# Patient Record
Sex: MALE | Race: WHITE | NOT HISPANIC OR LATINO | Employment: OTHER | URBAN - METROPOLITAN AREA
[De-identification: names, ages, dates, MRNs, and addresses within clinical notes are randomized per-mention and may not be internally consistent; named-entity substitution may affect disease eponyms.]

---

## 2022-04-22 RX ORDER — CHOLECALCIFEROL (VITAMIN D3) 25 MCG
1000 TABLET ORAL DAILY
COMMUNITY
Start: 2021-09-30 | End: 2023-06-07 | Stop reason: ALTCHOICE

## 2022-04-22 RX ORDER — LEVOTHYROXINE SODIUM 175 UG/1
175 TABLET ORAL DAILY
COMMUNITY

## 2022-04-22 RX ORDER — PRAZOSIN HYDROCHLORIDE 2 MG/1
2 CAPSULE ORAL NIGHTLY
COMMUNITY
Start: 2021-12-10

## 2022-04-22 RX ORDER — AMLODIPINE BESYLATE 10 MG/1
10 TABLET ORAL DAILY
COMMUNITY
End: 2023-06-07 | Stop reason: ALTCHOICE

## 2022-04-22 RX ORDER — DICLOFENAC SODIUM 10 MG/G
1 GEL TOPICAL 4 TIMES DAILY
COMMUNITY
End: 2023-09-13

## 2022-04-22 RX ORDER — FINASTERIDE 5 MG/1
5 TABLET, FILM COATED ORAL DAILY
COMMUNITY
Start: 2022-04-19

## 2022-04-22 RX ORDER — LISINOPRIL 40 MG/1
20 TABLET ORAL DAILY
COMMUNITY
Start: 2021-09-30 | End: 2023-09-13

## 2022-04-22 RX ORDER — ASPIRIN 81 MG/1
81 TABLET ORAL DAILY
COMMUNITY
Start: 2021-08-17

## 2022-04-22 RX ORDER — INSULIN GLARGINE 100 [IU]/ML
35 INJECTION, SOLUTION SUBCUTANEOUS NIGHTLY
COMMUNITY
Start: 2021-11-29

## 2022-04-22 RX ORDER — FUROSEMIDE 20 MG/1
20 TABLET ORAL
COMMUNITY
End: 2023-06-07 | Stop reason: ALTCHOICE

## 2022-04-22 RX ORDER — METFORMIN HYDROCHLORIDE 1000 MG/1
500 TABLET ORAL 2 TIMES DAILY WITH MEALS
COMMUNITY
Start: 2021-09-30 | End: 2023-06-07 | Stop reason: ALTCHOICE

## 2022-04-22 RX ORDER — METOPROLOL SUCCINATE 200 MG/1
100 TABLET, EXTENDED RELEASE ORAL NIGHTLY
COMMUNITY
Start: 2021-09-30

## 2022-04-22 RX ORDER — ATORVASTATIN CALCIUM 80 MG/1
80 TABLET, FILM COATED ORAL DAILY
COMMUNITY
Start: 2021-09-30

## 2022-04-22 RX ORDER — ISOSORBIDE MONONITRATE 30 MG/1
120 TABLET, EXTENDED RELEASE ORAL 2 TIMES DAILY
COMMUNITY

## 2022-04-22 RX ORDER — SERTRALINE HYDROCHLORIDE 100 MG/1
200 TABLET, FILM COATED ORAL DAILY
COMMUNITY
Start: 2021-09-30

## 2022-04-22 RX ORDER — HYDRALAZINE HYDROCHLORIDE 25 MG/1
25 TABLET, FILM COATED ORAL DAILY
COMMUNITY

## 2022-04-22 RX ORDER — FERROUS GLUCONATE 324(38)MG
324 TABLET ORAL DAILY
COMMUNITY
Start: 2021-09-30 | End: 2023-09-13

## 2022-04-27 ENCOUNTER — OFFICE VISIT (OUTPATIENT)
Dept: NEPHROLOGY | Facility: CLINIC | Age: 72
End: 2022-04-27
Payer: OTHER GOVERNMENT

## 2022-04-27 VITALS
HEIGHT: 65 IN | HEART RATE: 86 BPM | DIASTOLIC BLOOD PRESSURE: 57 MMHG | SYSTOLIC BLOOD PRESSURE: 105 MMHG | BODY MASS INDEX: 41.99 KG/M2 | WEIGHT: 252 LBS | OXYGEN SATURATION: 98 % | TEMPERATURE: 98 F

## 2022-04-27 DIAGNOSIS — N18.32 STAGE 3B CHRONIC KIDNEY DISEASE: Primary | ICD-10-CM

## 2022-04-27 PROCEDURE — 99213 OFFICE O/P EST LOW 20 MIN: CPT | Mod: S$GLB,,, | Performed by: INTERNAL MEDICINE

## 2022-04-27 PROCEDURE — 99213 PR OFFICE/OUTPT VISIT, EST, LEVL III, 20-29 MIN: ICD-10-PCS | Mod: S$GLB,,, | Performed by: INTERNAL MEDICINE

## 2022-04-27 NOTE — PROGRESS NOTES
"Nephrology Progress Note      Patient Name:  Victor Manuel Ignacio    :  1950    Medical Record:  [id number]    Interval History:  Seeing for ckd. No complaints.    Subjective:  Patient seen and examined. No complaints.    Current Medications: Current medications reviewed.    Review of Systems  Review of Systems   HENT: Negative for hearing loss.    Eyes: Negative for blurred vision.   Respiratory: Negative for cough, hemoptysis, sputum production and shortness of breath.    Cardiovascular: Negative for chest pain and palpitations.   Gastrointestinal: Negative for abdominal pain, blood in stool, constipation, diarrhea, heartburn, nausea and vomiting.   Genitourinary: Negative for dysuria.       Objective:      Physical Exam:   Vital Signs:  BP (!) 105/57 (BP Location: Left arm, Patient Position: Sitting, BP Method: Large (Automatic))   Pulse 86   Temp 98 °F (36.7 °C) (Oral)   Ht 5' 5" (1.651 m)   Wt 114.3 kg (252 lb)   SpO2 98%   BMI 41.93 kg/m²   Constitutional:  WNL.  HENT:  Normocephalic, Atraumatic, Pupils Reactive, Normal oropharynx, Nose normal.   Cardiovascular:  Normal heart rate, Normal rhythm, No murmurs, No rubs, No gallops.   Respiratory:  Normal breath sounds, No respiratory distress, No wheezing, No chest tenderness.   GI:  Bowel sounds normal, Soft, No tenderness, No masses, No pulsatile masses.  : No costovertebral angle tenderness    Extremities:  Intact distal pulses, No edema, No tenderness, No cyanosis, No clubbing.   Neurologic:  Alert & oriented x 3, Normal motor function, Normal sensory function, No focal deficits noted.  Skin:  Warm and dry  -  ANCA - neg and antigbm neg.      Labs:  No results for input(s): K, CO2, BUN, ALBUMIN, CALCIUM, GLU, HGB, WBC, PLT, INR in the last 72 hours.    Invalid input(s): CRE  .    Radiology:   none    Assessment:  ckd 3 - no proteinuria. prob from htn. Agree with oncology w/u for multiple myeloma.      Plan:  RTC 6 months.   With labs.    Electronically " signed by: Doris Aceves, 4/27/2022 2:04 PM.

## 2022-11-30 ENCOUNTER — OFFICE VISIT (OUTPATIENT)
Dept: NEPHROLOGY | Facility: CLINIC | Age: 72
End: 2022-11-30
Payer: OTHER GOVERNMENT

## 2022-11-30 VITALS
HEIGHT: 65 IN | HEART RATE: 83 BPM | SYSTOLIC BLOOD PRESSURE: 114 MMHG | OXYGEN SATURATION: 97 % | BODY MASS INDEX: 41.32 KG/M2 | WEIGHT: 248 LBS | DIASTOLIC BLOOD PRESSURE: 72 MMHG

## 2022-11-30 DIAGNOSIS — I10 PRIMARY HYPERTENSION: ICD-10-CM

## 2022-11-30 DIAGNOSIS — N18.32 STAGE 3B CHRONIC KIDNEY DISEASE: Primary | ICD-10-CM

## 2022-11-30 DIAGNOSIS — E11.22 TYPE 2 DIABETES MELLITUS WITH STAGE 3B CHRONIC KIDNEY DISEASE, WITHOUT LONG-TERM CURRENT USE OF INSULIN: ICD-10-CM

## 2022-11-30 DIAGNOSIS — N18.32 TYPE 2 DIABETES MELLITUS WITH STAGE 3B CHRONIC KIDNEY DISEASE, WITHOUT LONG-TERM CURRENT USE OF INSULIN: ICD-10-CM

## 2022-11-30 DIAGNOSIS — N25.81 SECONDARY HYPERPARATHYROIDISM OF RENAL ORIGIN: ICD-10-CM

## 2022-11-30 PROCEDURE — 99213 PR OFFICE/OUTPT VISIT, EST, LEVL III, 20-29 MIN: ICD-10-PCS | Mod: S$GLB,,, | Performed by: INTERNAL MEDICINE

## 2022-11-30 PROCEDURE — 99213 OFFICE O/P EST LOW 20 MIN: CPT | Mod: S$GLB,,, | Performed by: INTERNAL MEDICINE

## 2022-11-30 RX ORDER — HYDROCHLOROTHIAZIDE 25 MG/1
25 TABLET ORAL DAILY
COMMUNITY
Start: 2022-09-06 | End: 2023-09-13

## 2022-11-30 RX ORDER — OMEPRAZOLE 40 MG/1
40 CAPSULE, DELAYED RELEASE ORAL DAILY
COMMUNITY
Start: 2022-04-19

## 2022-11-30 NOTE — PROGRESS NOTES
Pt Name:  Victor Manuel Ignacio  Pt :  1950  Pt MRN:  [id number]    Date: 2022  Provider: Doris Aceves    Reason for visit:   Seeing for ckd.      Chief Complaint:   Chief Complaint   Patient presents with    Chronic Kidney Disease     Stage 3 cr 1.77 GFR 40    Follow-up     3 month       HPI:  [unfilled]  No complaints.      History:   Past Medical History:   Diagnosis Date    CKD (chronic kidney disease)     Depression     Gangrene of scrotum     HTN (hypertension)     Hypothyroidism, unspecified     Mixed hyperlipidemia     Paroxysmal atrial fibrillation     Type 2 diabetes mellitus      Past Surgical History:   Procedure Laterality Date    CORONARY ANGIOPLASTY WITH STENT PLACEMENT      HERNIA REPAIR      TONSILLECTOMY       Family History   Problem Relation Age of Onset    Cancer Mother     Depression Mother     Heart disease Father      Social History     Substance and Sexual Activity   Alcohol Use Yes    Comment: rarely     Social History     Substance and Sexual Activity   Drug Use Never     Social History     Substance and Sexual Activity   Sexual Activity Not Currently     reports that he is not currently sexually active.  Social History     Tobacco Use   Smoking Status Every Day    Types: Cigars   Smokeless Tobacco Never   Tobacco Comments    2 cigars daily       Allergies:  Review of patient's allergies indicates:   Allergen Reactions    Neomycin-bacitracin-polymyxin Rash and Anaphylaxis    Neosporin plus max st Anaphylaxis    Aspirin Other (See Comments)       [unfilled]    ROS:   Constitutional:  Denies fever or chills   Eyes:  Denies change in visual acuity   HENT:  Denies nasal congestion or sore throat   Respiratory:  As in the history of the present illness.   Cardiovascular:  As in the history of the present illness.   GI:  As in the history of the present illness.    Musculoskeletal:  Denies back pain or joint pain   Integument:  Denies rash   Neurologic:  Denies headache, focal weakness or  "sensory changes   Endocrine:  Denies polyuria or polydipsia   Lymphatic:  Denies swollen glands   Psychiatric:  Denies depression or anxiety    Physical Exam:   Vitals:   Vitals:    11/30/22 0941   BP: 114/72   Pulse: 83   SpO2: 97%   Weight: 112.5 kg (248 lb)   Height: 5' 5" (1.651 m)   PainSc: 0-No pain     Body mass index is 41.27 kg/m².    Constitutional:  Well developed, well nourished, and in no acute distress   Eyes:  PERRLA, conjunctiva normal   HENT:  Atraumatic, external ears normal, nose normal.  Neck: There is no jugular venous distension or thyromegaly.   Respiratory:  No respiratory distress, normal breath sounds, no rales, no wheezing   Cardiovascular:  Normal rate, and a regular rhythm, no murmurs, no gallops, no rub, and no edema.  GI:  Normal bowel sounds.  Musculoskeletal:  No deformities.   Neurologic:  Alert & oriented x 3, CN 2-12 normal, normal motor function, and no asterixis.   Psychiatric:  Speech and behavior appropriate.    Labs/Tests:  Lab Results   Component Value Date     03/15/2022    K 5.1 03/15/2022     (H) 03/15/2022    CO2 24 03/15/2022    BUN 43 (H) 03/15/2022    CREATININE 1.84 (H) 03/15/2022    CREATININE 1.73 (H) 11/06/2021    CREATININE 1.78 (H) 11/03/2021    GLUCOSE 81 03/15/2022    CALCIUM 8.7 03/15/2022    PHOSPHORUS 3.8 03/15/2022    ALBUMIN 3.5 03/15/2022    EGFRNONAA 36 (L) 03/15/2022    EGFRNONAA 39 (L) 11/06/2021    EGFRNONAA 37 (L) 11/03/2021    ESTGFRAFRICA 41 (L) 03/15/2022    ESTGFRAFRICA 45 (L) 11/06/2021    ESTGFRAFRICA 43 (L) 11/03/2021     (H) 11/28/2022    HGB 9.0 (L) 03/15/2022    HGB 8.8 (L) 11/06/2021    HGB 8.8 (L) 11/03/2021    TRIG 147 11/03/2021    CHOL 142 11/03/2021    HDL 33 (L) 08/13/2021    LDLCALC 71 08/13/2021    LABVLDL 25 08/13/2021       Assessment & Plan:    Visit Diagnosis:   [unfilled]  [unfilled]    Problem List: There is no problem list on file for this patient.    Ckd 3 b - stable.    Htn    DM- " nid.    Hyperparathyroidism secondary to ckd.            Follow Up:   Follow up in about 6 months (around 5/30/2023).

## 2023-06-07 ENCOUNTER — OFFICE VISIT (OUTPATIENT)
Dept: NEPHROLOGY | Facility: CLINIC | Age: 73
End: 2023-06-07
Payer: OTHER GOVERNMENT

## 2023-06-07 VITALS
BODY MASS INDEX: 40.65 KG/M2 | HEIGHT: 65 IN | HEART RATE: 86 BPM | OXYGEN SATURATION: 97 % | WEIGHT: 244 LBS | DIASTOLIC BLOOD PRESSURE: 61 MMHG | SYSTOLIC BLOOD PRESSURE: 119 MMHG

## 2023-06-07 DIAGNOSIS — E11.22 TYPE 2 DIABETES MELLITUS WITH STAGE 3B CHRONIC KIDNEY DISEASE, WITHOUT LONG-TERM CURRENT USE OF INSULIN: ICD-10-CM

## 2023-06-07 DIAGNOSIS — I10 PRIMARY HYPERTENSION: ICD-10-CM

## 2023-06-07 DIAGNOSIS — N18.32 STAGE 3B CHRONIC KIDNEY DISEASE: Primary | ICD-10-CM

## 2023-06-07 DIAGNOSIS — N18.32 TYPE 2 DIABETES MELLITUS WITH STAGE 3B CHRONIC KIDNEY DISEASE, WITHOUT LONG-TERM CURRENT USE OF INSULIN: ICD-10-CM

## 2023-06-07 PROCEDURE — 99214 OFFICE O/P EST MOD 30 MIN: CPT | Mod: S$GLB,,, | Performed by: INTERNAL MEDICINE

## 2023-06-07 PROCEDURE — 99214 PR OFFICE/OUTPT VISIT, EST, LEVL IV, 30-39 MIN: ICD-10-PCS | Mod: S$GLB,,, | Performed by: INTERNAL MEDICINE

## 2023-06-07 RX ORDER — UBIDECARENONE 75 MG
500 CAPSULE ORAL DAILY
COMMUNITY
Start: 2023-05-25

## 2023-06-07 NOTE — PROGRESS NOTES
Pt Name:  Victor Manuel Ignacio  Pt :  1950  Pt MRN:  [id number]    Date: 2023  Provider: Doris Aceves    Reason for visit:   Seeing for ckd.      Chief Complaint:   Chief Complaint   Patient presents with    Chronic Kidney Disease     Serum creatinine 2.10, GFR 32, CKD stage 3       HPI:  HPI   No complaints. No gi, gu, card, on pulm complaints.       History:   Past Medical History:   Diagnosis Date    CKD (chronic kidney disease)     Depression     Gangrene of scrotum     HTN (hypertension)     Hypothyroidism, unspecified     Mixed hyperlipidemia     Paroxysmal atrial fibrillation     Type 2 diabetes mellitus      Past Surgical History:   Procedure Laterality Date    CORONARY ANGIOPLASTY WITH STENT PLACEMENT      HERNIA REPAIR      TONSILLECTOMY       Family History   Problem Relation Age of Onset    Cancer Mother     Depression Mother     Heart disease Father      Social History     Substance and Sexual Activity   Alcohol Use Yes    Comment: rarely     Social History     Substance and Sexual Activity   Drug Use Never     Social History     Substance and Sexual Activity   Sexual Activity Not Currently     reports that he is not currently sexually active.  Social History     Tobacco Use   Smoking Status Every Day    Types: Cigars   Smokeless Tobacco Never   Tobacco Comments    2 cigars daily       Allergies:  Review of patient's allergies indicates:   Allergen Reactions    Neomycin-bacitracin-polymyxin Rash and Anaphylaxis    Neosporin plus max st Anaphylaxis       Current Outpatient Medications   Medication Sig Dispense Refill    aspirin (ECOTRIN) 81 MG EC tablet Take 81 mg by mouth once daily at 6am.      atorvastatin (LIPITOR) 80 MG tablet Take 80 mg by mouth once daily at 6am.      cyanocobalamin 500 MCG tablet 500 mcg once daily.      ferrous gluconate (FERGON) 324 MG tablet Take 324 mg by mouth once daily.      finasteride (PROSCAR) 5 mg tablet Take 5 mg by mouth once daily.      hydrALAZINE (APRESOLINE)  "25 MG tablet Take 25 mg by mouth once daily.      hydroCHLOROthiazide (HYDRODIURIL) 25 MG tablet Take 25 mg by mouth Daily.      isosorbide mononitrate (IMDUR) 120 MG 24 hr tablet Take 120 mg by mouth 2 (two) times a day.      levothyroxine (SYNTHROID) 137 MCG Tab tablet Take 175 mcg by mouth once daily.      lisinopriL (PRINIVIL,ZESTRIL) 40 MG tablet Take 20 mg by mouth once daily.      metoprolol succinate (TOPROL-XL) 200 MG 24 hr tablet Take 100 mg by mouth every evening.      omeprazole (PRILOSEC) 40 MG capsule Take 40 mg by mouth Daily.      prazosin (MINIPRESS) 2 MG Cap Take 2 mg by mouth every evening.      semaglutide (OZEMPIC) 0.25 mg or 0.5 mg(2 mg/1.5 mL) pen injector Inject 0.25 mg into the skin every 7 days.      sertraline (ZOLOFT) 100 MG tablet Take 200 mg by mouth once daily.      diclofenac sodium (VOLTAREN) 1 % Gel Apply 1 application topically 4 (four) times daily.      insulin glargine 100 units/mL (3mL) SubQ pen Inject 35 Units into the skin every evening.       No current facility-administered medications for this visit.        ROS:   Constitutional:  Denies fever or chills   Eyes:  Denies change in visual acuity   HENT:  Denies nasal congestion or sore throat   Respiratory:  As in the history of the present illness.   Cardiovascular:  As in the history of the present illness.   GI:  As in the history of the present illness.    Musculoskeletal:  Denies back pain or joint pain   Integument:  Denies rash   Neurologic:  Denies headache, focal weakness or sensory changes   Endocrine:  Denies polyuria or polydipsia   Lymphatic:  Denies swollen glands   Psychiatric:  Denies depression or anxiety    Physical Exam:   Vitals:   Vitals:    06/07/23 1450   BP: 119/61   Pulse: 86   SpO2: 97%   Weight: 110.7 kg (244 lb)   Height: 5' 5" (1.651 m)   PainSc: 0-No pain     Body mass index is 40.6 kg/m².    Constitutional:  Well developed, well nourished, and in no acute distress   Eyes:  PERRLA, conjunctiva " normal   HENT:  Atraumatic, external ears normal, nose normal.  Neck: There is no jugular venous distension or thyromegaly.   Respiratory:  No respiratory distress, normal breath sounds, no rales, no wheezing   Cardiovascular:  Normal rate, and a regular rhythm, no murmurs, no gallops, no rub, and no edema.  GI:  Normal bowel sounds.  Musculoskeletal:  No deformities.   Neurologic:  Alert & oriented x 3, CN 2-12 normal, normal motor function, and no asterixis.   Psychiatric:  Speech and behavior appropriate.    Labs/Tests:  Lab Results   Component Value Date     03/15/2022    K 5.1 03/15/2022     (H) 03/15/2022    CO2 24 03/15/2022    BUN 43 (H) 03/15/2022    CREATININE 1.84 (H) 03/15/2022    CREATININE 1.73 (H) 11/06/2021    CREATININE 1.78 (H) 11/03/2021    GLUCOSE Negative 06/05/2023    CALCIUM 8.7 03/15/2022    PHOSPHORUS 3.8 03/15/2022    ALBUMIN 3.5 03/15/2022    EGFRNONAA 36 (L) 03/15/2022    EGFRNONAA 39 (L) 11/06/2021    EGFRNONAA 37 (L) 11/03/2021    ESTGFRAFRICA 41 (L) 03/15/2022    ESTGFRAFRICA 45 (L) 11/06/2021    ESTGFRAFRICA 43 (L) 11/03/2021     (H) 06/05/2023    HGB 10.0 (L) 06/05/2023    HGB 9.7 (L) 04/18/2023    HGB 9.0 (L) 03/15/2022    TRIG 147 11/03/2021    CHOL 142 11/03/2021    HDL 33 (L) 08/13/2021    LDLCALC 71 08/13/2021    LABVLDL 25 08/13/2021       Assessment & Plan:    Visit Diagnosis:   1. Stage 3b chronic kidney disease  Renal Function Panel    PTH, Intact    CBC Auto Differential    Urinalysis    Renal Function Panel    PTH, Intact    CBC Auto Differential    Urinalysis      2. Primary hypertension        3. Type 2 diabetes mellitus with stage 3b chronic kidney disease, without long-term current use of insulin           Problem List: There is no problem list on file for this patient.    Ckd 3b - recheck in only three months and drink water.    Dm 2 on insulin    Htn        1. Stage 3b chronic kidney disease  -     Renal Function Panel; Future; Expected date:  09/07/2023  -     PTH, Intact; Future; Expected date: 09/07/2023  -     CBC Auto Differential; Future; Expected date: 09/07/2023  -     Urinalysis; Future; Expected date: 09/07/2023    2. Primary hypertension    3. Type 2 diabetes mellitus with stage 3b chronic kidney disease, without long-term current use of insulin             Follow Up:   Follow up in about 3 months (around 9/7/2023).

## 2023-09-13 ENCOUNTER — OFFICE VISIT (OUTPATIENT)
Dept: NEPHROLOGY | Facility: CLINIC | Age: 73
End: 2023-09-13
Payer: OTHER GOVERNMENT

## 2023-09-13 VITALS
OXYGEN SATURATION: 98 % | HEIGHT: 65 IN | SYSTOLIC BLOOD PRESSURE: 137 MMHG | WEIGHT: 249 LBS | DIASTOLIC BLOOD PRESSURE: 64 MMHG | BODY MASS INDEX: 41.48 KG/M2 | HEART RATE: 87 BPM

## 2023-09-13 DIAGNOSIS — I10 PRIMARY HYPERTENSION: ICD-10-CM

## 2023-09-13 DIAGNOSIS — N25.81 SECONDARY HYPERPARATHYROIDISM OF RENAL ORIGIN: ICD-10-CM

## 2023-09-13 DIAGNOSIS — E11.22 TYPE 2 DIABETES MELLITUS WITH STAGE 3B CHRONIC KIDNEY DISEASE, WITHOUT LONG-TERM CURRENT USE OF INSULIN: ICD-10-CM

## 2023-09-13 DIAGNOSIS — N18.32 TYPE 2 DIABETES MELLITUS WITH STAGE 3B CHRONIC KIDNEY DISEASE, WITHOUT LONG-TERM CURRENT USE OF INSULIN: ICD-10-CM

## 2023-09-13 DIAGNOSIS — N18.32 STAGE 3B CHRONIC KIDNEY DISEASE: Primary | ICD-10-CM

## 2023-09-13 PROCEDURE — 99214 OFFICE O/P EST MOD 30 MIN: CPT | Mod: S$GLB,,, | Performed by: INTERNAL MEDICINE

## 2023-09-13 PROCEDURE — 99214 PR OFFICE/OUTPT VISIT, EST, LEVL IV, 30-39 MIN: ICD-10-PCS | Mod: S$GLB,,, | Performed by: INTERNAL MEDICINE

## 2023-09-13 RX ORDER — FERROUS SULFATE 325(65) MG
325 TABLET ORAL
COMMUNITY

## 2023-09-13 RX ORDER — AMOXICILLIN 250 MG
1 CAPSULE ORAL 2 TIMES DAILY
COMMUNITY
Start: 2023-08-17

## 2023-09-13 RX ORDER — CHOLECALCIFEROL (VITAMIN D3) 50 MCG
50 TABLET ORAL
COMMUNITY
Start: 2023-05-31

## 2023-09-13 NOTE — PROGRESS NOTES
Pt Name:  Victor Manuel Ignacio  Pt :  1950  Pt MRN:  [id number]    Date: 2023  Provider: Doris Aceves    Reason for visit: seeing for ckd.       Chief Complaint:   Chief Complaint   Patient presents with    Chronic Kidney Disease     Cre: 1.66 Gfr:43 CKD 3B       HPI:  HPI   No complaints. No gi, gu, pulm or card complaints.     History:   Past Medical History:   Diagnosis Date    CKD (chronic kidney disease)     Depression     Gangrene of scrotum     HTN (hypertension)     Hypothyroidism, unspecified     Mixed hyperlipidemia     Paroxysmal atrial fibrillation     Type 2 diabetes mellitus      Past Surgical History:   Procedure Laterality Date    CORONARY ANGIOPLASTY WITH STENT PLACEMENT      CYST REMOVAL      HERNIA REPAIR      TONSILLECTOMY       Family History   Problem Relation Age of Onset    Cancer Mother     Depression Mother     Heart disease Father      Social History     Substance and Sexual Activity   Alcohol Use Not Currently     Social History     Substance and Sexual Activity   Drug Use Never     Social History     Substance and Sexual Activity   Sexual Activity Not Currently     reports that he is not currently sexually active.  Social History     Tobacco Use   Smoking Status Every Day    Types: Cigars   Smokeless Tobacco Never   Tobacco Comments    2 cigars daily       Allergies:  Review of patient's allergies indicates:   Allergen Reactions    Neomycin-bacitracin-polymyxin Rash and Anaphylaxis    Neosporin plus max st Anaphylaxis       Current Outpatient Medications   Medication Sig Dispense Refill    aspirin (ECOTRIN) 81 MG EC tablet Take 81 mg by mouth once daily at 6am.      atorvastatin (LIPITOR) 80 MG tablet Take 80 mg by mouth once daily at 6am.      cholecalciferol, vitamin D3, (VITAMIN D3) 50 mcg (2,000 unit) Tab 50 mcg.      cyanocobalamin 500 MCG tablet 500 mcg once daily.      finasteride (PROSCAR) 5 mg tablet Take 5 mg by mouth once daily.      hydrALAZINE (APRESOLINE) 25 MG tablet  "Take 25 mg by mouth once daily.      insulin glargine 100 units/mL (3mL) SubQ pen Inject 35 Units into the skin every evening.      isosorbide mononitrate (IMDUR) 30 MG 24 hr tablet Take 120 mg by mouth 2 (two) times a day.      levothyroxine (SYNTHROID, LEVOTHROID) 175 MCG tablet Take 175 mcg by mouth once daily.      metoprolol succinate (TOPROL-XL) 200 MG 24 hr tablet Take 100 mg by mouth every evening.      omeprazole (PRILOSEC) 40 MG capsule Take 40 mg by mouth Daily.      prazosin (MINIPRESS) 2 MG Cap Take 2 mg by mouth every evening.      semaglutide (OZEMPIC) 0.25 mg or 0.5 mg(2 mg/1.5 mL) pen injector Inject 0.5 mg into the skin every 7 days.      senna-docusate 8.6-50 mg (PERICOLACE) 8.6-50 mg per tablet Take 1 tablet by mouth 2 (two) times daily.      sertraline (ZOLOFT) 100 MG tablet Take 200 mg by mouth once daily.      ferrous sulfate (FEOSOL) 325 mg (65 mg iron) Tab tablet Take 325 mg by mouth daily with breakfast.       No current facility-administered medications for this visit.        ROS:   Constitutional:  Denies fever or chills   Eyes:  Denies change in visual acuity   HENT:  Denies nasal congestion or sore throat   Respiratory:  As in the history of the present illness.   Cardiovascular:  As in the history of the present illness.   GI:  As in the history of the present illness.    Musculoskeletal:  Denies back pain or joint pain   Integument:  Denies rash   Neurologic:  Denies headache, focal weakness or sensory changes   Endocrine:  Denies polyuria or polydipsia   Lymphatic:  Denies swollen glands   Psychiatric:  Denies depression or anxiety    Physical Exam:   Vitals:   Vitals:    09/13/23 1335   BP: 137/64   Pulse: 87   SpO2: 98%   Weight: 112.9 kg (249 lb)   Height: 5' 5" (1.651 m)     Body mass index is 41.44 kg/m².    Constitutional:  Well developed, well nourished, and in no acute distress   Eyes:  PERRLA, conjunctiva normal   HENT:  Atraumatic, external ears normal, nose normal.  Neck: " There is no jugular venous distension or thyromegaly.   Respiratory:  No respiratory distress, normal breath sounds, no rales, no wheezing   Cardiovascular:  Normal rate, and a regular rhythm, no murmurs, no gallops, no rub, and no edema.  GI:  Normal bowel sounds.  Musculoskeletal:  No deformities.   Neurologic:  Alert & oriented x 3, CN 2-12 normal, normal motor function, and no asterixis.   Psychiatric:  Speech and behavior appropriate.    Labs/Tests:  Lab Results   Component Value Date     03/15/2022    K 5.1 03/15/2022     (H) 03/15/2022    CO2 24 03/15/2022    BUN 43 (H) 03/15/2022    CREATININE 1.84 (H) 03/15/2022    CREATININE 1.73 (H) 11/06/2021    CREATININE 1.78 (H) 11/03/2021    GLUCOSE Negative 06/05/2023    CALCIUM 8.7 03/15/2022    PHOSPHORUS 3.8 03/15/2022    ALBUMIN 3.5 03/15/2022    EGFRNONAA 36 (L) 03/15/2022    EGFRNONAA 39 (L) 11/06/2021    EGFRNONAA 37 (L) 11/03/2021    ESTGFRAFRICA 41 (L) 03/15/2022    ESTGFRAFRICA 45 (L) 11/06/2021    ESTGFRAFRICA 43 (L) 11/03/2021     (H) 06/05/2023    HGB 10.0 (L) 06/05/2023    HGB 9.7 (L) 04/18/2023    HGB 9.0 (L) 03/15/2022    TRIG 147 11/03/2021    CHOL 142 11/03/2021    HDL 33 (L) 08/13/2021    LDLCALC 71 08/13/2021    LABVLDL 25 08/13/2021       Assessment & Plan:    Visit Diagnosis:   1. Stage 3b chronic kidney disease  Renal Function Panel    PTH, Intact    CBC Auto Differential    Urinalysis    Renal Function Panel    PTH, Intact    CBC Auto Differential    Urinalysis      2. Primary hypertension        3. Secondary hyperparathyroidism of renal origin        4. Type 2 diabetes mellitus with stage 3b chronic kidney disease, without long-term current use of insulin           Problem List: There is no problem list on file for this patient.  =  Ckd 3b    Htn    Hyperparathyroidism secondary to ckd.    Dm2 on insulin.        1. Stage 3b chronic kidney disease  -     Renal Function Panel; Future; Expected date: 03/13/2024  -     PTH,  Intact; Future; Expected date: 03/13/2024  -     CBC Auto Differential; Future; Expected date: 03/13/2024  -     Urinalysis; Future; Expected date: 03/13/2024    2. Primary hypertension    3. Secondary hyperparathyroidism of renal origin    4. Type 2 diabetes mellitus with stage 3b chronic kidney disease, without long-term current use of insulin             Follow Up:   Follow up in about 6 months (around 3/13/2024).